# Patient Record
Sex: FEMALE | Race: WHITE | NOT HISPANIC OR LATINO | Employment: OTHER | URBAN - NONMETROPOLITAN AREA
[De-identification: names, ages, dates, MRNs, and addresses within clinical notes are randomized per-mention and may not be internally consistent; named-entity substitution may affect disease eponyms.]

---

## 2017-02-21 ENCOUNTER — OFFICE VISIT (OUTPATIENT)
Dept: GASTROENTEROLOGY | Facility: CLINIC | Age: 72
End: 2017-02-21

## 2017-02-21 VITALS
OXYGEN SATURATION: 99 % | TEMPERATURE: 97.6 F | BODY MASS INDEX: 23.74 KG/M2 | HEART RATE: 56 BPM | DIASTOLIC BLOOD PRESSURE: 70 MMHG | WEIGHT: 129 LBS | SYSTOLIC BLOOD PRESSURE: 116 MMHG | HEIGHT: 62 IN

## 2017-02-21 DIAGNOSIS — Z86.010 HX OF COLONIC POLYP: Primary | ICD-10-CM

## 2017-02-21 DIAGNOSIS — Z80.0 FAMILY HX OF COLON CANCER: ICD-10-CM

## 2017-02-21 DIAGNOSIS — I10 ESSENTIAL HYPERTENSION: ICD-10-CM

## 2017-02-21 PROCEDURE — S0260 H&P FOR SURGERY: HCPCS | Performed by: NURSE PRACTITIONER

## 2017-02-21 RX ORDER — LEVOTHYROXINE SODIUM 88 MCG
TABLET ORAL
COMMUNITY
Start: 2016-11-29

## 2017-02-21 RX ORDER — OXYBUTYNIN CHLORIDE 10 MG/1
TABLET, EXTENDED RELEASE ORAL
COMMUNITY
Start: 2017-02-10

## 2017-02-21 RX ORDER — ERGOCALCIFEROL 1.25 MG/1
CAPSULE ORAL
COMMUNITY
Start: 2016-12-26

## 2017-02-21 RX ORDER — CYCLOSPORINE 0.5 MG/ML
EMULSION OPHTHALMIC
COMMUNITY
Start: 2016-12-22

## 2017-02-21 RX ORDER — OMEPRAZOLE 40 MG/1
CAPSULE, DELAYED RELEASE ORAL
COMMUNITY
Start: 2016-12-23

## 2017-02-21 RX ORDER — FLUCONAZOLE 150 MG/1
TABLET ORAL
COMMUNITY
Start: 2016-11-16 | End: 2017-02-21

## 2017-02-21 RX ORDER — LISINOPRIL AND HYDROCHLOROTHIAZIDE 20; 12.5 MG/1; MG/1
TABLET ORAL
COMMUNITY
Start: 2017-02-13

## 2017-02-21 RX ORDER — NEBIVOLOL HYDROCHLORIDE 5 MG/1
TABLET ORAL
COMMUNITY
Start: 2017-02-13

## 2017-02-21 NOTE — PROGRESS NOTES
Chief Complaint   Patient presents with   • Colonoscopy   pcp dr giuseppe KOCH    Germaine Golden is a 71 y.o. female who presents as a referral for preventative maintenance. She has no complaints of nausea or vomiting. No change in bowels. No wt loss. No BRBPR. No melena. No abdominal pain.there is family h/o colon cancer. Her last cscope was 12/2013. She has h/o colon polyps.     Past Medical History   Diagnosis Date   • Colon polyp    • GERD (gastroesophageal reflux disease)    • Hyperlipidemia    • Hypertension    • Hypothyroidism        Past Surgical History   Procedure Laterality Date   • Hysterectomy     • Colonoscopy  12/31/2013       Outpatient Prescriptions Marked as Taking for the 2/21/17 encounter (Office Visit) with SERGIO Boudreaux   Medication Sig Dispense Refill   • BYSTOLIC 5 MG tablet      • lisinopril-hydrochlorothiazide (PRINZIDE,ZESTORETIC) 20-12.5 MG per tablet      • omeprazole (priLOSEC) 40 MG capsule      • oxybutynin XL (DITROPAN-XL) 10 MG 24 hr tablet      • RESTASIS 0.05 % ophthalmic emulsion      • SYNTHROID 88 MCG tablet      • vitamin D (ERGOCALCIFEROL) 20929 UNITS capsule capsule Every 7 (Seven) Days.         Allergies   Allergen Reactions   • Alendronate    • Ampicillin    • Percodan [Oxycodone-Aspirin]    • Pravastatin    • Simvastatin        Social History     Social History   • Marital status:      Spouse name: N/A   • Number of children: N/A   • Years of education: N/A     Occupational History   • Not on file.     Social History Main Topics   • Smoking status: Former Smoker     Quit date: 1985   • Smokeless tobacco: Not on file   • Alcohol use No   • Drug use: No   • Sexual activity: Not on file     Other Topics Concern   • Not on file     Social History Narrative       Family History   Problem Relation Age of Onset   • Colon cancer Paternal Aunt    • Colon polyps Son        Review of Systems   Constitutional: Negative for appetite change, chills,  fatigue, fever and unexpected weight change.   HENT: Negative for sore throat and trouble swallowing.    Eyes: Negative for pain and visual disturbance.   Respiratory: Negative for cough, chest tightness, shortness of breath and wheezing.    Cardiovascular: Negative for chest pain and palpitations.   Gastrointestinal: Negative for abdominal distention, abdominal pain, anal bleeding, blood in stool, constipation, diarrhea, nausea, rectal pain and vomiting.        As mentioned in hpi   Endocrine: Negative for cold intolerance and heat intolerance.   Genitourinary: Negative for difficulty urinating, dysuria and hematuria.   Musculoskeletal: Negative for arthralgias and back pain.   Skin: Negative for color change and rash.   Neurological: Negative for dizziness, tremors, seizures, syncope, light-headedness and headaches.   Hematological: Negative for adenopathy. Does not bruise/bleed easily.   Psychiatric/Behavioral: Negative for confusion. The patient is not nervous/anxious.        Objective     Vitals:    02/21/17 1432   BP: 116/70   Pulse: 56   Temp: 97.6 °F (36.4 °C)   SpO2: 99%   Body mass index is 23.59 kg/(m^2).      Physical Exam   Constitutional: She is oriented to person, place, and time. She appears well-developed and well-nourished. No distress.   HENT:   Head: Normocephalic and atraumatic.   Mouth/Throat: Oropharynx is clear and moist.   Eyes: Pupils are equal, round, and reactive to light. No scleral icterus.   Neck: Normal range of motion. Neck supple. No JVD present. No tracheal deviation present.   Cardiovascular: Normal rate, regular rhythm, normal heart sounds and intact distal pulses.  Exam reveals no gallop and no friction rub.    No murmur heard.  Pulmonary/Chest: Effort normal and breath sounds normal. No stridor. No respiratory distress. She has no wheezes. She has no rales.   Abdominal: Soft. Bowel sounds are normal. She exhibits no distension and no mass. There is no tenderness. There is no  rebound and no guarding.   Musculoskeletal: Normal range of motion. She exhibits no edema or deformity.   Neurological: She is alert and oriented to person, place, and time. No cranial nerve deficit.   Skin: Skin is warm and dry. No rash noted.   Psychiatric: She has a normal mood and affect. Her behavior is normal.   Vitals reviewed.      Imaging Results (most recent)     None          Assessment/Plan     Germaine was seen today for colonoscopy.    Diagnoses and all orders for this visit:    Hx of colonic polyp  -     Case request    Family hx of colon cancer  -     Case request    Essential hypertension    Other orders  -     Sod Picosulfate-Mag Ox-Cit Acd (PREPOPIK) 10-3.5-12 MG-GM-GM pack; Take 1 container by mouth 1 (One) Time for 1 dose. Take as directed per instruction sheet     plan for colonoscopy.  Instructed patient to take her blood pressure medication name of procedure if that is when she normally takes.    COLONOSCOPY WITH ANESTHESIA (N/A)  All risks, benefits, alternatives, and indications of colonoscopy procedure have been discussed with the patient. Risks to include perforation of the colon requiring possible surgery or colostomy, risk of bleeding from biopsies or removal of colon tissue, possibility of missing a colon polyp or cancer, or adverse drug reaction.  Benefits to include the diagnosis and management of disease of the colon and rectum. Alternatives to include barium enema, radiographic evaluation, lab testing or no intervention. Pt verbalizes understanding and agrees.       SERGIO Brooks      EMR Dragon/transcription disclaimer:  Much of this encounter note is electronic transcription/translation of spoken language to printed text.  The electronic translation of spoken language may be erroneous, or at times, nonsensical words or phrases may be inadvertently transcribed.  Although I have reviewed the note for such errors, some may still exist.

## 2017-03-14 ENCOUNTER — OUTSIDE FACILITY SERVICE (OUTPATIENT)
Dept: GASTROENTEROLOGY | Facility: CLINIC | Age: 72
End: 2017-03-14

## 2017-03-14 PROCEDURE — G0105 COLORECTAL SCRN; HI RISK IND: HCPCS | Performed by: INTERNAL MEDICINE

## 2020-06-24 ENCOUNTER — LAB REQUISITION (OUTPATIENT)
Dept: LAB | Facility: HOSPITAL | Age: 75
End: 2020-06-24

## 2020-06-24 DIAGNOSIS — Z00.00 ENCOUNTER FOR GENERAL ADULT MEDICAL EXAMINATION WITHOUT ABNORMAL FINDINGS: ICD-10-CM

## 2020-06-24 PROCEDURE — 88305 TISSUE EXAM BY PATHOLOGIST: CPT | Performed by: SPECIALIST

## 2020-06-26 LAB
CYTO UR: NORMAL
LAB AP CASE REPORT: NORMAL
LAB AP CLINICAL INFORMATION: NORMAL
PATH REPORT.FINAL DX SPEC: NORMAL
PATH REPORT.GROSS SPEC: NORMAL

## 2022-03-01 ENCOUNTER — OFFICE VISIT (OUTPATIENT)
Dept: GASTROENTEROLOGY | Facility: CLINIC | Age: 77
End: 2022-03-01

## 2022-03-01 VITALS
HEIGHT: 62 IN | OXYGEN SATURATION: 99 % | SYSTOLIC BLOOD PRESSURE: 146 MMHG | TEMPERATURE: 96.8 F | WEIGHT: 123 LBS | BODY MASS INDEX: 22.63 KG/M2 | HEART RATE: 55 BPM | DIASTOLIC BLOOD PRESSURE: 74 MMHG

## 2022-03-01 DIAGNOSIS — Z80.0 FAMILY HX OF COLON CANCER: ICD-10-CM

## 2022-03-01 DIAGNOSIS — Z86.010 HISTORY OF ADENOMATOUS POLYP OF COLON: Primary | ICD-10-CM

## 2022-03-01 DIAGNOSIS — Z83.71 FAMILY HX COLONIC POLYPS: ICD-10-CM

## 2022-03-01 PROCEDURE — S0260 H&P FOR SURGERY: HCPCS | Performed by: NURSE PRACTITIONER

## 2022-03-01 RX ORDER — SODIUM PICOSULFATE, MAGNESIUM OXIDE, AND ANHYDROUS CITRIC ACID 10; 3.5; 12 MG/160ML; G/160ML; G/160ML
160 LIQUID ORAL ONCE
Qty: 320 ML | Refills: 0 | Status: SHIPPED | OUTPATIENT
Start: 2022-03-01 | End: 2022-03-01

## 2022-03-02 PROBLEM — Z86.0101 HISTORY OF ADENOMATOUS POLYP OF COLON: Status: ACTIVE | Noted: 2022-03-02

## 2022-03-02 PROBLEM — Z83.719 FAMILY HX COLONIC POLYPS: Status: ACTIVE | Noted: 2022-03-02

## 2022-03-02 PROBLEM — Z83.71 FAMILY HX COLONIC POLYPS: Status: ACTIVE | Noted: 2022-03-02

## 2022-03-02 PROBLEM — Z86.010 HISTORY OF ADENOMATOUS POLYP OF COLON: Status: ACTIVE | Noted: 2022-03-02

## 2022-03-02 PROBLEM — Z80.0 FAMILY HX OF COLON CANCER: Status: ACTIVE | Noted: 2022-03-02

## 2022-03-25 ENCOUNTER — ANESTHESIA EVENT (OUTPATIENT)
Dept: GASTROENTEROLOGY | Facility: HOSPITAL | Age: 77
End: 2022-03-25

## 2022-03-25 ENCOUNTER — ANESTHESIA (OUTPATIENT)
Dept: GASTROENTEROLOGY | Facility: HOSPITAL | Age: 77
End: 2022-03-25

## 2022-03-25 ENCOUNTER — TELEPHONE (OUTPATIENT)
Dept: GASTROENTEROLOGY | Facility: CLINIC | Age: 77
End: 2022-03-25

## 2022-03-25 ENCOUNTER — HOSPITAL ENCOUNTER (OUTPATIENT)
Facility: HOSPITAL | Age: 77
Setting detail: HOSPITAL OUTPATIENT SURGERY
Discharge: HOME | End: 2022-03-25
Attending: INTERNAL MEDICINE | Admitting: INTERNAL MEDICINE

## 2022-03-25 VITALS
SYSTOLIC BLOOD PRESSURE: 130 MMHG | DIASTOLIC BLOOD PRESSURE: 68 MMHG | RESPIRATION RATE: 16 BRPM | WEIGHT: 117 LBS | TEMPERATURE: 97.6 F | HEART RATE: 68 BPM | BODY MASS INDEX: 21.53 KG/M2 | HEIGHT: 62 IN | OXYGEN SATURATION: 98 %

## 2022-03-25 PROCEDURE — 25010000002 PROPOFOL 10 MG/ML EMULSION: Performed by: NURSE ANESTHETIST, CERTIFIED REGISTERED

## 2022-03-25 PROCEDURE — 45385 COLONOSCOPY W/LESION REMOVAL: CPT | Performed by: INTERNAL MEDICINE

## 2022-03-25 RX ORDER — ONDANSETRON 2 MG/ML
4 INJECTION INTRAMUSCULAR; INTRAVENOUS ONCE AS NEEDED
Status: DISCONTINUED | OUTPATIENT
Start: 2022-03-25 | End: 2022-03-25 | Stop reason: HOSPADM

## 2022-03-25 RX ORDER — LIDOCAINE HYDROCHLORIDE 10 MG/ML
0.5 INJECTION, SOLUTION EPIDURAL; INFILTRATION; INTRACAUDAL; PERINEURAL ONCE AS NEEDED
Status: DISCONTINUED | OUTPATIENT
Start: 2022-03-25 | End: 2022-03-25 | Stop reason: HOSPADM

## 2022-03-25 RX ORDER — LIDOCAINE HYDROCHLORIDE 20 MG/ML
INJECTION, SOLUTION EPIDURAL; INFILTRATION; INTRACAUDAL; PERINEURAL AS NEEDED
Status: DISCONTINUED | OUTPATIENT
Start: 2022-03-25 | End: 2022-03-25 | Stop reason: SURG

## 2022-03-25 RX ORDER — PROPOFOL 10 MG/ML
VIAL (ML) INTRAVENOUS AS NEEDED
Status: DISCONTINUED | OUTPATIENT
Start: 2022-03-25 | End: 2022-03-25 | Stop reason: SURG

## 2022-03-25 RX ORDER — SODIUM CHLORIDE 0.9 % (FLUSH) 0.9 %
10 SYRINGE (ML) INJECTION AS NEEDED
Status: DISCONTINUED | OUTPATIENT
Start: 2022-03-25 | End: 2022-03-25 | Stop reason: HOSPADM

## 2022-03-25 RX ORDER — SODIUM CHLORIDE 9 MG/ML
500 INJECTION, SOLUTION INTRAVENOUS CONTINUOUS PRN
Status: DISCONTINUED | OUTPATIENT
Start: 2022-03-25 | End: 2022-03-25 | Stop reason: HOSPADM

## 2022-03-25 RX ADMIN — PROPOFOL 50 MG: 10 INJECTION, EMULSION INTRAVENOUS at 08:56

## 2022-03-25 RX ADMIN — SODIUM CHLORIDE 500 ML: 9 INJECTION, SOLUTION INTRAVENOUS at 07:42

## 2022-03-25 RX ADMIN — LIDOCAINE HYDROCHLORIDE 50 MG: 20 INJECTION, SOLUTION EPIDURAL; INFILTRATION; INTRACAUDAL; PERINEURAL at 08:47

## 2022-03-25 RX ADMIN — PROPOFOL 100 MG: 10 INJECTION, EMULSION INTRAVENOUS at 08:48

## 2022-03-25 NOTE — ANESTHESIA PREPROCEDURE EVALUATION
Anesthesia Evaluation     Patient summary reviewed   no history of anesthetic complications:  NPO Solid Status: > 8 hours  NPO Liquid Status: > 4 hours           Airway   Mallampati: I  TM distance: >3 FB  Neck ROM: full  Dental    (+) partials and upper dentures    Pulmonary - negative pulmonary ROS   Cardiovascular   Exercise tolerance: good (4-7 METS)    (+) hypertension, hyperlipidemia,       Neuro/Psych- negative ROS  GI/Hepatic/Renal/Endo    (+)  GERD,  thyroid problem hypothyroidism  (-) liver disease, no renal disease, diabetes    Musculoskeletal     Abdominal    Substance History      OB/GYN          Other                        Anesthesia Plan    ASA 2     MAC     intravenous induction     Anesthetic plan, all risks, benefits, and alternatives have been provided, discussed and informed consent has been obtained with: patient.        CODE STATUS:

## 2022-03-25 NOTE — ANESTHESIA POSTPROCEDURE EVALUATION
Patient: Germaine Golden    Procedure Summary     Date: 03/25/22 Room / Location: [facility] ENDOSCOPY 4 / [facility]    Anesthesia Start: 0843 Anesthesia Stop: 0905    Procedure: COLONOSCOPY WITH ANESTHESIA (N/A ) Diagnosis:       History of adenomatous polyp of colon      Family hx of colon cancer      Family hx colonic polyps      (History of adenomatous polyp of colon [Z86.010])      (Family hx of colon cancer [Z80.0])      (Family hx colonic polyps [Z83.71])    Surgeons: Joao Saleem MD Provider: Tim Navarro CRNA    Anesthesia Type: MAC ASA Status: 2          Anesthesia Type: MAC    Vitals  Vitals Value Taken Time   BP     Temp     Pulse 64 03/25/22 0905   Resp     SpO2 98 % 03/25/22 0905   Vitals shown include unvalidated device data.        Post Anesthesia Care and Evaluation    Patient location during evaluation: PHASE II  Patient participation: complete - patient participated  Level of consciousness: awake  Pain score: 0  Pain management: adequate  Airway patency: patent  Anesthetic complications: No anesthetic complications  PONV Status: none  Cardiovascular status: acceptable  Respiratory status: acceptable  Hydration status: acceptable

## 2022-11-08 ENCOUNTER — TRANSCRIBE ORDERS (OUTPATIENT)
Dept: ADMINISTRATIVE | Facility: HOSPITAL | Age: 77
End: 2022-11-08

## 2022-11-08 DIAGNOSIS — Z78.0 OSTEOPENIA AFTER MENOPAUSE: Primary | ICD-10-CM

## 2022-11-08 DIAGNOSIS — M85.80 OSTEOPENIA AFTER MENOPAUSE: Primary | ICD-10-CM

## 2022-11-21 ENCOUNTER — APPOINTMENT (OUTPATIENT)
Dept: ONCOLOGY | Facility: HOSPITAL | Age: 77
End: 2022-11-21

## 2022-11-21 ENCOUNTER — APPOINTMENT (OUTPATIENT)
Dept: LAB | Facility: HOSPITAL | Age: 77
End: 2022-11-21

## 2022-11-28 ENCOUNTER — INFUSION (OUTPATIENT)
Dept: ONCOLOGY | Facility: HOSPITAL | Age: 77
End: 2022-11-28

## 2022-11-28 ENCOUNTER — LAB (OUTPATIENT)
Dept: LAB | Facility: HOSPITAL | Age: 77
End: 2022-11-28

## 2022-11-28 VITALS
HEIGHT: 62 IN | RESPIRATION RATE: 16 BRPM | BODY MASS INDEX: 23 KG/M2 | HEART RATE: 65 BPM | TEMPERATURE: 97.7 F | SYSTOLIC BLOOD PRESSURE: 153 MMHG | OXYGEN SATURATION: 100 % | WEIGHT: 125 LBS | DIASTOLIC BLOOD PRESSURE: 59 MMHG

## 2022-11-28 DIAGNOSIS — M85.80 OSTEOPENIA, UNSPECIFIED LOCATION: Primary | ICD-10-CM

## 2022-11-28 DIAGNOSIS — M85.80 OSTEOPENIA AFTER MENOPAUSE: ICD-10-CM

## 2022-11-28 DIAGNOSIS — Z78.0 OSTEOPENIA AFTER MENOPAUSE: ICD-10-CM

## 2022-11-28 LAB
CALCIUM SPEC-SCNC: 9.5 MG/DL (ref 8.6–10.5)
MAGNESIUM SERPL-MCNC: 2.1 MG/DL (ref 1.6–2.4)
PHOSPHATE SERPL-MCNC: 3.3 MG/DL (ref 2.5–4.5)

## 2022-11-28 PROCEDURE — 96372 THER/PROPH/DIAG INJ SC/IM: CPT

## 2022-11-28 PROCEDURE — 83735 ASSAY OF MAGNESIUM: CPT

## 2022-11-28 PROCEDURE — 82310 ASSAY OF CALCIUM: CPT

## 2022-11-28 PROCEDURE — 25010000002 DENOSUMAB 60 MG/ML SOLUTION PREFILLED SYRINGE: Performed by: PHYSICIAN ASSISTANT

## 2022-11-28 PROCEDURE — 36415 COLL VENOUS BLD VENIPUNCTURE: CPT

## 2022-11-28 PROCEDURE — 84100 ASSAY OF PHOSPHORUS: CPT

## 2022-11-28 RX ADMIN — DENOSUMAB 60 MG: 60 INJECTION SUBCUTANEOUS at 09:42

## 2023-02-02 ENCOUNTER — TELEPHONE (OUTPATIENT)
Dept: OTOLARYNGOLOGY | Facility: CLINIC | Age: 78
End: 2023-02-02
Payer: MEDICARE

## 2023-02-02 NOTE — TELEPHONE ENCOUNTER
s/p upper and lower bleph and brow and facelift 40 years ago by Cesario.  Was very pleased.   s/p upper and lower bleph by Dr. Santos 15 years ago.  [place] by redundancy of upper eyelids and excessive skin of the lower eyelids. Also significant laxity. Thinks it is interfering with vision - gave order on Rx for VFT.  Could benefit from Botox to glabella and crows, but would not want to alter VFT.  Also, interested in revision facelift.  Gave Kal's number to call.    Electronically signed by Hong Mckeon MD, 02/02/23, 3:52 PM CST.

## 2023-02-14 ENCOUNTER — TELEPHONE (OUTPATIENT)
Dept: OTOLARYNGOLOGY | Facility: CLINIC | Age: 78
End: 2023-02-14

## 2023-02-14 NOTE — TELEPHONE ENCOUNTER
Caller: LISA SAMAYOA    Relationship to patient: SELF    Best call back number: [phone]    Patient is needing: PT STATES SHE HAD HER EYE DOCTOR DO HER EYELID TEST ON 2/9/23 @ [facility] AND REPORT HAS BEEN FAXED, PENDING RECEIPT.

## 2023-05-01 ENCOUNTER — OFFICE VISIT (OUTPATIENT)
Dept: OTOLARYNGOLOGY | Facility: CLINIC | Age: 78
End: 2023-05-01
Payer: MEDICARE

## 2023-05-01 VITALS
RESPIRATION RATE: 20 BRPM | HEART RATE: 85 BPM | WEIGHT: 125 LBS | HEIGHT: 62 IN | BODY MASS INDEX: 23 KG/M2 | TEMPERATURE: 98 F | SYSTOLIC BLOOD PRESSURE: 142 MMHG | DIASTOLIC BLOOD PRESSURE: 78 MMHG

## 2023-05-01 DIAGNOSIS — H02.834 DERMATOCHALASIS OF BOTH UPPER EYELIDS: Primary | ICD-10-CM

## 2023-05-01 DIAGNOSIS — H02.30 PSEUDOPTOSIS, UNSPECIFIED LATERALITY: ICD-10-CM

## 2023-05-01 DIAGNOSIS — H02.831 DERMATOCHALASIS OF BOTH UPPER EYELIDS: Primary | ICD-10-CM

## 2023-05-01 DIAGNOSIS — H53.483 BILATERAL VISUAL FIELD CONSTRICTION: ICD-10-CM

## 2023-05-01 RX ORDER — IBUPROFEN 200 MG
1 TABLET ORAL EVERY 6 HOURS
COMMUNITY

## 2023-05-01 RX ORDER — LISINOPRIL 20 MG/1
TABLET ORAL
COMMUNITY

## 2023-05-01 RX ORDER — EZETIMIBE 10 MG/1
TABLET ORAL
COMMUNITY
Start: 2021-10-30

## 2023-05-01 RX ORDER — BIOTIN 10000 MCG
CAPSULE ORAL
COMMUNITY

## 2023-05-01 RX ORDER — ATENOLOL 25 MG/1
TABLET ORAL
COMMUNITY
Start: 2021-03-30

## 2023-05-01 RX ORDER — NEBIVOLOL 10 MG/1
TABLET ORAL
COMMUNITY
End: 2023-05-01

## 2023-05-01 RX ORDER — LEVOTHYROXINE SODIUM 100 UG/1
1 CAPSULE ORAL DAILY
COMMUNITY

## 2023-05-01 RX ORDER — TERBINAFINE HYDROCHLORIDE 250 MG/1
TABLET ORAL
COMMUNITY

## 2023-05-01 RX ORDER — OLMESARTAN MEDOXOMIL, AMLODIPINE AND HYDROCHLOROTHIAZIDE TABLET 40/5/25 MG 40; 5; 25 MG/1; MG/1; MG/1
TABLET ORAL
COMMUNITY
End: 2023-05-01

## 2023-05-01 RX ORDER — ROSUVASTATIN CALCIUM 5 MG/1
1 TABLET, COATED ORAL DAILY
COMMUNITY
End: 2023-05-01

## 2023-05-01 RX ORDER — FERROUS SULFATE 325(65) MG
TABLET ORAL EVERY 12 HOURS SCHEDULED
COMMUNITY

## 2023-05-01 RX ORDER — OMEPRAZOLE 40 MG/1
1 CAPSULE, DELAYED RELEASE ORAL
COMMUNITY
End: 2023-05-01

## 2023-05-01 RX ORDER — OXYBUTYNIN CHLORIDE 5 MG/1
1 TABLET ORAL EVERY 12 HOURS
COMMUNITY
End: 2023-05-01

## 2023-05-01 RX ORDER — DEXTROMETHORPHAN HYDROBROMIDE AND PROMETHAZINE HYDROCHLORIDE 15; 6.25 MG/5ML; MG/5ML
SYRUP ORAL
COMMUNITY

## 2023-05-01 RX ORDER — LISINOPRIL AND HYDROCHLOROTHIAZIDE 20; 12.5 MG/1; MG/1
TABLET ORAL
COMMUNITY
End: 2023-05-01

## 2023-05-01 NOTE — LETTER
May 1, 2023       No Recipients    Patient: Germaine Golden   YOB: 1945   Date of Visit: 2023       Dear Dr. Koenig Recipients:    Thank you for referring Germaine Golden to me for evaluation. Below are the relevant portions of my assessment and plan of care.    If you have questions, please do not hesitate to call me. I look forward to following Germaine along with you.         Sincerely,        Hong Mckeon MD        CC:   No Recipients    Hong Mckeon MD  23 1451  Signed  Chief Complaint:   Chief Complaint   Patient presents with   • Blepharitis     Bleph eval       HPI: Germaine Golden is a 78 y.o. who presents for blepharoplasty evaluation.  She reports for excess eyelid skin affecting both upper eyelids for 3 years. She doesn't lie the look of it.  She did not know it was bothering her peripheral vision until the VFT was performed.  Bothered by the fullness in the left lower eyelid. This was revised by Dr. Santos, but noticed no improvement    She has had an upper and lower blepharoplasty, brow lift, and facelift 40 years ago by Dr. Nassar.  She is also had upper and lower blepharoplasty by Dr. Santos 15 years ago.    Past Medical History:   Diagnosis Date   • Colon polyp    • GERD (gastroesophageal reflux disease)    • Hyperlipidemia    • Hypertension    • Hypothyroidism      Past Surgical History:   Procedure Laterality Date   • COLONOSCOPY  2013   • COLONOSCOPY N/A 3/25/2022    Procedure: COLONOSCOPY WITH ANESTHESIA;  Surgeon: Joao Saleem MD;  Location: [facility] ENDOSCOPY;  Service: Gastroenterology;  Laterality: N/A;  pre hx polyp, family hx of colon ca  post polyp  Colt Beckham MD   • HYSTERECTOMY       Family History   Problem Relation Age of Onset   • Colon cancer Paternal Aunt    • Colon polyps Son      Social History     Tobacco Use   • Smoking status: Former     Types: Cigarettes     Quit date:      Years since quittin.3   • Smokeless  tobacco: Never   Substance Use Topics   • Alcohol use: No   • Drug use: No     Allergies:  Alendronate, Levaquin [levofloxacin], Pravastatin, Simvastatin, Ampicillin, and Percodan [oxycodone-aspirin]    Current Outpatient Medications:   •  atenolol (TENORMIN) 25 MG tablet, atenolol 25 mg tablet  TAKE 1 TABLET DAILY, Disp: , Rfl:   •  Biotin 10 MG capsule, , Disp: , Rfl:   •  denosumab (Prolia) 60 MG/ML solution syringe, Prolia 60 mg/mL subcutaneous syringe  Inject 1ml every 6 months, Disp: , Rfl:   •  ezetimibe (ZETIA) 10 MG tablet, ezetimibe 10 mg tablet  Take 1 tablet every day by oral route., Disp: , Rfl:   •  ferrous sulfate 325 (65 FE) MG tablet, Every 12 (Twelve) Hours., Disp: , Rfl:   •  ibuprofen (ADVIL,MOTRIN) 200 MG tablet, Take 1 tablet by mouth Every 6 (Six) Hours., Disp: , Rfl:   •  levothyroxine sodium (TIROSINT) 100 MCG capsule, Take 1 capsule by mouth Daily., Disp: , Rfl:   •  lisinopril (PRINIVIL,ZESTRIL) 20 MG tablet, lisinopril 20 mg tablet, Disp: , Rfl:   •  omeprazole (priLOSEC) 40 MG capsule, , Disp: , Rfl:   •  oxybutynin XL (DITROPAN-XL) 10 MG 24 hr tablet, , Disp: , Rfl:   •  pitavastatin calcium (LIVALO) 1 MG tablet tablet, Livalo 1 mg tablet, Disp: , Rfl:   •  promethazine-dextromethorphan (PROMETHAZINE-DM) 6.25-15 MG/5ML syrup, promethazine-DM 6.25 mg-15 mg/5 mL oral syrup, Disp: , Rfl:   •  SYNTHROID 88 MCG tablet, , Disp: , Rfl:   •  terbinafine (lamiSIL) 250 MG tablet, terbinafine HCl 250 mg tablet, Disp: , Rfl:   •  vitamin D (ERGOCALCIFEROL) 73862 UNITS capsule capsule, Every 7 (Seven) Days., Disp: , Rfl:     Previous Eyelid Surgery: Upper and lower bleph x 2 (plus revision on the left lower)  Previous Eye Surgery: none.  Left eye is lazy.  Vision very poor on the left.  Dry Eye Symptoms: very  Contact lens or eyeglass use: Glasses  Eye medication use: none  Thyroid Issues: Takes synthroid.    Review of Systems   Constitutional: Negative for chills and fever.   Eyes: Negative for  "pain and visual disturbance.       Constitutional:  /78   Pulse 85   Temp 98 °F (36.7 °C)   Resp 20   Ht 157.5 cm (62\")   Wt 56.7 kg (125 lb)   BMI 22.86 kg/m² .    Well-developed and well nourished.    Eyes: PERR.  EOMI.  No icterus.  Conjunctiva is healthy.  Lids:  -OD: Excessive upper eyelid skin with hooding.  Decent levator excursion (18 mm) without evidence of ptosis.  MRD-1: 3.0 mm.  Lower lid fails snap test.  -OS: Excessive upper eyelid skin with hooding.  Decent levator excursion (18 mm) without evidence of ptosis.  MRD-1: 3.0 mm.  Lower lid with mild ectropion.  Fails snap test.  Bothered by the fullness in the left lower eyelid.  Brow:  Above the orbital rim  Ears, Nose, Mouth, and throat:: NCAT.  External auricles normal without EAC discharge.  Nose: No rhinnorhea.    Neck: No mass.  Symmetric.  Trachea midline.    Respiratory: Normal effort.      Skin: No concerning lesions of the head/neck    Neuro: CN II-XII intact bilaterally.      MS: Normal gait and station.    Data Review: A visual field test has been performed. The results are available in the chart. Visual field testing demonstrates a 20 degree improvement on the right and a 20 degree improvement on the left with taping.    Bothered by fullness of the right lateral      Assessment    1. Dermatochalasis of both upper eyelids    2. Pseudoptosis, unspecified laterality    3. Bilateral visual field constriction        With lack of visual complaints, I have offered a cosmetic upper blepharoplasty.  We discussed the risks, benefits, alternatives, and complications of a bilateral upper blepharoplasty, which include, but are not limited to, bleeding, bruising, dry eyes, milia formation, damage to the eyes, loss of sight, pain, eyelid malposition.    She may opt for lower blepharoplasty with tarsal tightening down the road.       She would like this to be performed in the office under local.    Hong Mckeon MD  05/01/23  14:36 " CDT

## 2023-05-01 NOTE — PROGRESS NOTES
Chief Complaint:   Chief Complaint   Patient presents with   • Blepharitis     Bleph eval       HPI: Germaine Golden is a 78 y.o. who presents for blepharoplasty evaluation.  She reports for excess eyelid skin affecting both upper eyelids for 3 years. She doesn't lie the look of it.  She did not know it was bothering her peripheral vision until the VFT was performed.  Bothered by the fullness in the left lower eyelid. This was revised by Dr. Santos, but noticed no improvement    She has had an upper and lower blepharoplasty, brow lift, and facelift 40 years ago by Dr. Nassar.  She is also had upper and lower blepharoplasty by Dr. Santos 15 years ago.    Past Medical History:   Diagnosis Date   • Colon polyp    • GERD (gastroesophageal reflux disease)    • Hyperlipidemia    • Hypertension    • Hypothyroidism      Past Surgical History:   Procedure Laterality Date   • COLONOSCOPY  2013   • COLONOSCOPY N/A 3/25/2022    Procedure: COLONOSCOPY WITH ANESTHESIA;  Surgeon: Joao Saleem MD;  Location: [facility] ENDOSCOPY;  Service: Gastroenterology;  Laterality: N/A;  pre hx polyp, family hx of colon ca  post polyp  Colt Beckham MD   • HYSTERECTOMY       Family History   Problem Relation Age of Onset   • Colon cancer Paternal Aunt    • Colon polyps Son      Social History     Tobacco Use   • Smoking status: Former     Types: Cigarettes     Quit date:      Years since quittin.3   • Smokeless tobacco: Never   Substance Use Topics   • Alcohol use: No   • Drug use: No     Allergies:  Alendronate, Levaquin [levofloxacin], Pravastatin, Simvastatin, Ampicillin, and Percodan [oxycodone-aspirin]    Current Outpatient Medications:   •  atenolol (TENORMIN) 25 MG tablet, atenolol 25 mg tablet  TAKE 1 TABLET DAILY, Disp: , Rfl:   •  Biotin 10 MG capsule, , Disp: , Rfl:   •  denosumab (Prolia) 60 MG/ML solution syringe, Prolia 60 mg/mL subcutaneous syringe  Inject 1ml every 6 months, Disp: , Rfl:   •   "ezetimibe (ZETIA) 10 MG tablet, ezetimibe 10 mg tablet  Take 1 tablet every day by oral route., Disp: , Rfl:   •  ferrous sulfate 325 (65 FE) MG tablet, Every 12 (Twelve) Hours., Disp: , Rfl:   •  ibuprofen (ADVIL,MOTRIN) 200 MG tablet, Take 1 tablet by mouth Every 6 (Six) Hours., Disp: , Rfl:   •  levothyroxine sodium (TIROSINT) 100 MCG capsule, Take 1 capsule by mouth Daily., Disp: , Rfl:   •  lisinopril (PRINIVIL,ZESTRIL) 20 MG tablet, lisinopril 20 mg tablet, Disp: , Rfl:   •  omeprazole (priLOSEC) 40 MG capsule, , Disp: , Rfl:   •  oxybutynin XL (DITROPAN-XL) 10 MG 24 hr tablet, , Disp: , Rfl:   •  pitavastatin calcium (LIVALO) 1 MG tablet tablet, Livalo 1 mg tablet, Disp: , Rfl:   •  promethazine-dextromethorphan (PROMETHAZINE-DM) 6.25-15 MG/5ML syrup, promethazine-DM 6.25 mg-15 mg/5 mL oral syrup, Disp: , Rfl:   •  SYNTHROID 88 MCG tablet, , Disp: , Rfl:   •  terbinafine (lamiSIL) 250 MG tablet, terbinafine HCl 250 mg tablet, Disp: , Rfl:   •  vitamin D (ERGOCALCIFEROL) 30358 UNITS capsule capsule, Every 7 (Seven) Days., Disp: , Rfl:     Previous Eyelid Surgery: Upper and lower bleph x 2 (plus revision on the left lower)  Previous Eye Surgery: none.  Left eye is lazy.  Vision very poor on the left.  Dry Eye Symptoms: very  Contact lens or eyeglass use: Glasses  Eye medication use: none  Thyroid Issues: Takes synthroid.    Review of Systems   Constitutional: Negative for chills and fever.   Eyes: Negative for pain and visual disturbance.       Constitutional:  /78   Pulse 85   Temp 98 °F (36.7 °C)   Resp 20   Ht 157.5 cm (62\")   Wt 56.7 kg (125 lb)   BMI 22.86 kg/m² .    Well-developed and well nourished.    Eyes: PERR.  EOMI.  No icterus.  Conjunctiva is healthy.  Lids:  -OD: Excessive upper eyelid skin with hooding.  Decent levator excursion (18 mm) without evidence of ptosis.  MRD-1: 3.0 mm.  Lower lid fails snap test.  -OS: Excessive upper eyelid skin with hooding.  Decent levator excursion (18 " mm) without evidence of ptosis.  MRD-1: 3.0 mm.  Lower lid with mild ectropion.  Fails snap test.  Bothered by the fullness in the left lower eyelid.  Brow:  Above the orbital rim  Ears, Nose, Mouth, and throat:: NCAT.  External auricles normal without EAC discharge.  Nose: No rhinnorhea.    Neck: No mass.  Symmetric.  Trachea midline.    Respiratory: Normal effort.      Skin: No concerning lesions of the head/neck    Neuro: CN II-XII intact bilaterally.      MS: Normal gait and station.    Data Review: A visual field test has been performed. The results are available in the chart. Visual field testing demonstrates a 20 degree improvement on the right and a 20 degree improvement on the left with taping.    Bothered by fullness of the right lateral      Assessment   1. Dermatochalasis of both upper eyelids    2. Pseudoptosis, unspecified laterality    3. Bilateral visual field constriction        With lack of visual complaints, I have offered a cosmetic upper blepharoplasty.  We discussed the risks, benefits, alternatives, and complications of a bilateral upper blepharoplasty, which include, but are not limited to, bleeding, bruising, dry eyes, milia formation, damage to the eyes, loss of sight, pain, eyelid malposition.    She may opt for lower blepharoplasty with tarsal tightening down the road.       She would like this to be performed in the office under local.    Hong Mckeon MD  05/01/23  14:36 CDT

## 2023-05-30 ENCOUNTER — INFUSION (OUTPATIENT)
Dept: ONCOLOGY | Facility: HOSPITAL | Age: 78
End: 2023-05-30

## 2023-05-30 ENCOUNTER — LAB (OUTPATIENT)
Dept: LAB | Facility: HOSPITAL | Age: 78
End: 2023-05-30

## 2023-05-30 ENCOUNTER — TRANSCRIBE ORDERS (OUTPATIENT)
Dept: ADMINISTRATIVE | Facility: HOSPITAL | Age: 78
End: 2023-05-30

## 2023-05-30 VITALS
HEIGHT: 62 IN | SYSTOLIC BLOOD PRESSURE: 166 MMHG | TEMPERATURE: 98 F | HEART RATE: 57 BPM | DIASTOLIC BLOOD PRESSURE: 46 MMHG | BODY MASS INDEX: 23 KG/M2 | WEIGHT: 125 LBS | RESPIRATION RATE: 18 BRPM | OXYGEN SATURATION: 98 %

## 2023-05-30 DIAGNOSIS — M85.80 OSTEOPENIA, UNSPECIFIED LOCATION: Primary | ICD-10-CM

## 2023-05-30 DIAGNOSIS — M85.80 OSTEOPENIA, UNSPECIFIED LOCATION: ICD-10-CM

## 2023-05-30 LAB
CALCIUM SPEC-SCNC: 9.2 MG/DL (ref 8.6–10.5)
MAGNESIUM SERPL-MCNC: 2.1 MG/DL (ref 1.6–2.4)
PHOSPHATE SERPL-MCNC: 3.2 MG/DL (ref 2.5–4.5)

## 2023-05-30 PROCEDURE — 83735 ASSAY OF MAGNESIUM: CPT

## 2023-05-30 PROCEDURE — 84100 ASSAY OF PHOSPHORUS: CPT

## 2023-05-30 PROCEDURE — 82310 ASSAY OF CALCIUM: CPT

## 2023-05-30 PROCEDURE — 36415 COLL VENOUS BLD VENIPUNCTURE: CPT

## 2023-05-31 ENCOUNTER — INFUSION (OUTPATIENT)
Dept: ONCOLOGY | Facility: HOSPITAL | Age: 78
End: 2023-05-31

## 2023-05-31 VITALS
SYSTOLIC BLOOD PRESSURE: 152 MMHG | HEART RATE: 60 BPM | TEMPERATURE: 98.4 F | WEIGHT: 124.4 LBS | OXYGEN SATURATION: 100 % | BODY MASS INDEX: 22.89 KG/M2 | DIASTOLIC BLOOD PRESSURE: 52 MMHG | RESPIRATION RATE: 18 BRPM | HEIGHT: 62 IN

## 2023-05-31 DIAGNOSIS — M85.80 OSTEOPENIA, UNSPECIFIED LOCATION: Primary | ICD-10-CM

## 2023-05-31 PROCEDURE — 25010000002 DENOSUMAB 60 MG/ML SOLUTION PREFILLED SYRINGE: Performed by: INTERNAL MEDICINE

## 2023-05-31 PROCEDURE — 96372 THER/PROPH/DIAG INJ SC/IM: CPT

## 2023-05-31 RX ADMIN — DENOSUMAB 60 MG: 60 INJECTION SUBCUTANEOUS at 09:38

## 2023-10-30 DIAGNOSIS — M85.80 OSTEOPENIA, UNSPECIFIED LOCATION: Primary | ICD-10-CM

## 2023-10-31 ENCOUNTER — TRANSCRIBE ORDERS (OUTPATIENT)
Dept: ADMINISTRATIVE | Facility: HOSPITAL | Age: 78
End: 2023-10-31
Payer: MEDICARE

## 2023-10-31 DIAGNOSIS — R07.89 OTHER CHEST PAIN: Primary | ICD-10-CM

## 2023-11-14 ENCOUNTER — HOSPITAL ENCOUNTER (OUTPATIENT)
Dept: CARDIOLOGY | Facility: HOSPITAL | Age: 78
Discharge: HOME | End: 2023-11-14
Payer: MEDICARE

## 2023-11-14 VITALS
SYSTOLIC BLOOD PRESSURE: 123 MMHG | BODY MASS INDEX: 22.26 KG/M2 | HEART RATE: 65 BPM | HEIGHT: 62 IN | DIASTOLIC BLOOD PRESSURE: 58 MMHG | WEIGHT: 121 LBS

## 2023-11-14 DIAGNOSIS — R07.89 OTHER CHEST PAIN: ICD-10-CM

## 2023-11-14 PROCEDURE — 93017 CV STRESS TEST TRACING ONLY: CPT

## 2023-11-14 PROCEDURE — 25510000001 PERFLUTREN 6.52 MG/ML SUSPENSION: Performed by: EMERGENCY MEDICINE

## 2023-11-14 PROCEDURE — 93350 STRESS TTE ONLY: CPT

## 2023-11-14 RX ADMIN — PERFLUTREN 8.48 MG: 6.52 INJECTION, SUSPENSION INTRAVENOUS at 10:32

## 2023-11-19 LAB
BH CV STRESS BP STAGE 1: NORMAL
BH CV STRESS BP STAGE 2: NORMAL
BH CV STRESS DURATION MIN STAGE 1: 3
BH CV STRESS DURATION MIN STAGE 2: 1
BH CV STRESS DURATION SEC STAGE 1: 0
BH CV STRESS DURATION SEC STAGE 2: 10
BH CV STRESS GRADE STAGE 1: 10
BH CV STRESS GRADE STAGE 2: 12
BH CV STRESS HR STAGE 1: 123
BH CV STRESS HR STAGE 2: 133
BH CV STRESS METS STAGE 1: 5
BH CV STRESS METS STAGE 2: 7.5
BH CV STRESS PROTOCOL 1: NORMAL
BH CV STRESS RECOVERY BP: NORMAL MMHG
BH CV STRESS RECOVERY HR: 74 BPM
BH CV STRESS SPEED STAGE 1: 1.7
BH CV STRESS SPEED STAGE 2: 2.5
BH CV STRESS STAGE 1: 1
BH CV STRESS STAGE 2: 2
MAXIMAL PREDICTED HEART RATE: 140 BPM
PERCENT MAX PREDICTED HR: 95 %
STRESS BASELINE BP: NORMAL MMHG
STRESS BASELINE HR: 65 BPM
STRESS PERCENT HR: 112 %
STRESS POST ESTIMATED WORKLOAD: 7.5 METS
STRESS POST EXERCISE DUR MIN: 4 MIN
STRESS POST EXERCISE DUR SEC: 10 SEC
STRESS POST PEAK BP: NORMAL MMHG
STRESS POST PEAK HR: 133 BPM
STRESS TARGET HR: 119 BPM

## 2023-12-01 ENCOUNTER — LAB (OUTPATIENT)
Dept: LAB | Facility: HOSPITAL | Age: 78
End: 2023-12-01
Payer: MEDICARE

## 2023-12-01 ENCOUNTER — INFUSION (OUTPATIENT)
Dept: ONCOLOGY | Facility: HOSPITAL | Age: 78
End: 2023-12-01
Payer: MEDICARE

## 2023-12-01 VITALS
BODY MASS INDEX: 23.19 KG/M2 | OXYGEN SATURATION: 97 % | RESPIRATION RATE: 18 BRPM | HEIGHT: 62 IN | HEART RATE: 66 BPM | TEMPERATURE: 97.6 F | WEIGHT: 126 LBS | DIASTOLIC BLOOD PRESSURE: 55 MMHG | SYSTOLIC BLOOD PRESSURE: 126 MMHG

## 2023-12-01 DIAGNOSIS — M85.80 OSTEOPENIA, UNSPECIFIED LOCATION: ICD-10-CM

## 2023-12-01 DIAGNOSIS — M85.80 OSTEOPENIA, UNSPECIFIED LOCATION: Primary | ICD-10-CM

## 2023-12-01 LAB
25(OH)D3 SERPL-MCNC: 63.3 NG/ML (ref 30–100)
ALBUMIN SERPL-MCNC: 4.1 G/DL (ref 3.5–5.2)
ALBUMIN/GLOB SERPL: 1.5 G/DL
ALP SERPL-CCNC: 67 U/L (ref 39–117)
ALT SERPL W P-5'-P-CCNC: 12 U/L (ref 1–33)
ANION GAP SERPL CALCULATED.3IONS-SCNC: 8 MMOL/L (ref 5–15)
AST SERPL-CCNC: 17 U/L (ref 1–32)
BILIRUB SERPL-MCNC: 0.3 MG/DL (ref 0–1.2)
BUN SERPL-MCNC: 19 MG/DL (ref 8–23)
BUN/CREAT SERPL: 31.7 (ref 7–25)
CALCIUM SPEC-SCNC: 9.2 MG/DL (ref 8.6–10.5)
CALCIUM SPEC-SCNC: 9.2 MG/DL (ref 8.6–10.5)
CHLORIDE SERPL-SCNC: 102 MMOL/L (ref 98–107)
CO2 SERPL-SCNC: 30 MMOL/L (ref 22–29)
CREAT SERPL-MCNC: 0.6 MG/DL (ref 0.57–1)
EGFRCR SERPLBLD CKD-EPI 2021: 90.9 ML/MIN/1.73
GLOBULIN UR ELPH-MCNC: 2.7 GM/DL
GLUCOSE SERPL-MCNC: 90 MG/DL (ref 65–99)
MAGNESIUM SERPL-MCNC: 2 MG/DL (ref 1.6–2.4)
PHOSPHATE SERPL-MCNC: 3.3 MG/DL (ref 2.5–4.5)
POTASSIUM SERPL-SCNC: 3.7 MMOL/L (ref 3.5–5.2)
PROT SERPL-MCNC: 6.8 G/DL (ref 6–8.5)
SODIUM SERPL-SCNC: 140 MMOL/L (ref 136–145)

## 2023-12-01 PROCEDURE — 82310 ASSAY OF CALCIUM: CPT

## 2023-12-01 PROCEDURE — 25010000002 DENOSUMAB 60 MG/ML SOLUTION PREFILLED SYRINGE: Performed by: INTERNAL MEDICINE

## 2023-12-01 PROCEDURE — 83735 ASSAY OF MAGNESIUM: CPT

## 2023-12-01 PROCEDURE — 82306 VITAMIN D 25 HYDROXY: CPT

## 2023-12-01 PROCEDURE — 80053 COMPREHEN METABOLIC PANEL: CPT

## 2023-12-01 PROCEDURE — 96372 THER/PROPH/DIAG INJ SC/IM: CPT

## 2023-12-01 PROCEDURE — 84100 ASSAY OF PHOSPHORUS: CPT

## 2023-12-01 RX ADMIN — DENOSUMAB 60 MG: 60 INJECTION SUBCUTANEOUS at 10:40

## 2024-01-11 ENCOUNTER — PREP FOR SURGERY (OUTPATIENT)
Dept: OTHER | Facility: HOSPITAL | Age: 79
End: 2024-01-11
Payer: MEDICARE

## 2024-01-11 ENCOUNTER — OFFICE VISIT (OUTPATIENT)
Dept: CARDIOLOGY | Facility: CLINIC | Age: 79
End: 2024-01-11
Payer: MEDICARE

## 2024-01-11 VITALS
DIASTOLIC BLOOD PRESSURE: 70 MMHG | WEIGHT: 128 LBS | SYSTOLIC BLOOD PRESSURE: 130 MMHG | HEIGHT: 62 IN | OXYGEN SATURATION: 96 % | BODY MASS INDEX: 23.55 KG/M2 | HEART RATE: 63 BPM

## 2024-01-11 DIAGNOSIS — I10 BENIGN ESSENTIAL HYPERTENSION: ICD-10-CM

## 2024-01-11 DIAGNOSIS — E78.2 MIXED HYPERLIPIDEMIA: ICD-10-CM

## 2024-01-11 DIAGNOSIS — R94.39 ABNORMAL STRESS TEST: Primary | ICD-10-CM

## 2024-01-11 PROBLEM — M79.10 MYALGIA DUE TO HMG COA REDUCTASE INHIBITOR: Status: ACTIVE | Noted: 2023-05-02

## 2024-01-11 PROBLEM — T46.6X5A MYALGIA DUE TO HMG COA REDUCTASE INHIBITOR: Status: ACTIVE | Noted: 2023-05-02

## 2024-01-11 PROBLEM — E78.5 HYPERLIPIDEMIA: Status: ACTIVE | Noted: 2024-01-11

## 2024-01-11 RX ORDER — SODIUM CHLORIDE 9 MG/ML
40 INJECTION, SOLUTION INTRAVENOUS AS NEEDED
Status: CANCELLED | OUTPATIENT
Start: 2024-01-11

## 2024-01-11 RX ORDER — ASPIRIN 81 MG/1
81 TABLET ORAL DAILY
Status: CANCELLED | OUTPATIENT
Start: 2024-01-12

## 2024-01-11 RX ORDER — ASPIRIN 81 MG/1
81 TABLET ORAL DAILY
COMMUNITY
End: 2024-01-15 | Stop reason: HOSPADM

## 2024-01-11 RX ORDER — SODIUM CHLORIDE 0.9 % (FLUSH) 0.9 %
3 SYRINGE (ML) INJECTION EVERY 12 HOURS SCHEDULED
Status: CANCELLED | OUTPATIENT
Start: 2024-01-11

## 2024-01-11 RX ORDER — ASPIRIN 81 MG/1
324 TABLET, CHEWABLE ORAL ONCE
Status: CANCELLED | OUTPATIENT
Start: 2024-01-11 | End: 2024-01-11

## 2024-01-11 RX ORDER — SODIUM CHLORIDE 0.9 % (FLUSH) 0.9 %
10 SYRINGE (ML) INJECTION AS NEEDED
Status: CANCELLED | OUTPATIENT
Start: 2024-01-11

## 2024-01-11 NOTE — H&P (VIEW-ONLY)
[facility] - CARDIOLOGY  New Patient Initial Outpatient Evaluation    Primary Care Physician: Colt Beckham MD    Subjective     Chief Complaint/Reason for Referral: Abnormal stress test    History of Present Illness    Ms. Germaine Golden was kindly referred to me by Dr. Beckham for the above. He treats her for hypertension, and hyperlipidemia as vascular risk factors. She has had intolerance to numerous different statin medications including pravastatin, and simvastatin.    Ms. Germaine Golden comes to the clinic today stating that she likes to walk.  She reports that when she would start her walk, she would feel a burning, and pressure in her chest, which started over 1 year ago. She states that she has not been doing much walking lately because she does not feel like it. She reports that she is feeling those similar kinds of symptoms occasionally when she is doing activities. She states that it does not last very long at all. She reports that it occurs when she is exertional, and it has never happened while at rest. She reports that the other day it occurred, she was cleaning up all her [place] decorations, putting up the boxes, and getting them stored away when the burning sensation was noticed. She states that it was enough when it happened to make her stop doing what she was doing, and just rest for a little bit. She reports that it was almost immediately for the symptoms to go away.     She states that she feels pressure across the chest. She denies that the pressure does not move anywhere up to her neck, down her arm, or toward her back. She states that it does not make her feel winded at the time, sick to her stomach, or break out in a sweat. She reports that she could tell that she had some pressures when she did the treadmill stress test a few months ago.  She states that overall, chest burning is not getting worse in any respect in terms of being more intense when they happen, last longer, or  starting to happen at lesser activity level. She notes that the frequency has increased over the last year.     She reports that she had a heart catheterization years ago because she was having a lot of pain that was performed through her groin. She states that she must take a pill for indigestion.    She states that she would like to have the heart cauterization done as soon as possible, but mentions that she was supposed to see Dr. Beckham on 01/15/2024 to have blood work completed to check her iron levels after starting a new prescription for her iron levels.         Review of Systems   Constitutional: Negative for malaise/fatigue.   Cardiovascular:  Negative for chest pain, claudication, dyspnea on exertion, leg swelling, near-syncope, orthopnea, palpitations, paroxysmal nocturnal dyspnea and syncope.        Chest pressure as per the HPI.   Respiratory:  Positive for shortness of breath.    Hematologic/Lymphatic: Does not bruise/bleed easily.      Otherwise complete ROS reviewed and negative except as mentioned in the HPI.      Past Medical History:   Past Medical History:   Diagnosis Date    Abnormal ECG     Colon polyp     GERD (gastroesophageal reflux disease)     Hyperlipidemia     Hypertension     Hypothyroidism        Past Surgical History:  Past Surgical History:   Procedure Laterality Date    CARDIAC CATHETERIZATION      COLONOSCOPY  12/31/2013    COLONOSCOPY N/A 03/25/2022    Procedure: COLONOSCOPY WITH ANESTHESIA;  Surgeon: Joao Saleem MD;  Location: [facility] ENDOSCOPY;  Service: Gastroenterology;  Laterality: N/A;  pre hx polyp, family hx of colon ca  post polyp  Colt Beckham MD    HYSTERECTOMY         Family History: family history includes Colon cancer in her paternal aunt; Colon polyps in her son; Heart disease in her maternal grandmother.    Social History:  reports that she quit smoking about 39 years ago. Her smoking use included cigarettes. She has never used smokeless tobacco.  She reports current alcohol use. She reports that she does not use drugs.    Medications:  Prior to Admission medications    Medication Sig Start Date End Date Taking? Authorizing Provider   aspirin 81 MG EC tablet Take 1 tablet by mouth Daily.   Yes Cristian Yanez MD   atenolol (TENORMIN) 25 MG tablet atenolol 25 mg tablet   TAKE 1 TABLET DAILY 3/30/21  Yes Cristian Yanez MD   Biotin 10 MG capsule    Yes Cristian Yanez MD   denosumab (Prolia) 60 MG/ML solution syringe Prolia 60 mg/mL subcutaneous syringe   Inject 1ml every 6 months   Yes Cristian Yanez MD   ezetimibe (ZETIA) 10 MG tablet Taking once every 2-3 days 10/30/21  Yes Cristian Yanez MD   ferrous sulfate 325 (65 FE) MG tablet Every 12 (Twelve) Hours.   Yes Cristian Yanez MD   levothyroxine sodium (TIROSINT) 100 MCG capsule Take 1 capsule by mouth Daily.   Yes Cristian Yanez MD   lisinopril (PRINIVIL,ZESTRIL) 20 MG tablet lisinopril 20 mg tablet   Yes Cristian Yanez MD   omeprazole (priLOSEC) 40 MG capsule  12/23/16  Yes Cristian Yanez MD   oxybutynin XL (DITROPAN-XL) 10 MG 24 hr tablet 5 mg 2 (Two) Times a Day. 2/10/17  Yes Cristian Yanez MD   promethazine-dextromethorphan (PROMETHAZINE-DM) 6.25-15 MG/5ML syrup As Needed.   Yes Cristian Yanez MD   vitamin D (ERGOCALCIFEROL) 11294 UNITS capsule capsule Every 7 (Seven) Days. 12/26/16  Yes Cristian Yanez MD   ibuprofen (ADVIL,MOTRIN) 200 MG tablet Take 1 tablet by mouth Every 6 (Six) Hours.  1/11/24  Cristian Yanez MD   pitavastatin calcium (LIVALO) 1 MG tablet tablet Livalo 1 mg tablet  1/11/24  Cristian Yanez MD   SYNTHROID 88 MCG tablet  11/29/16 1/11/24  Cristian Yanez MD   terbinafine (lamiSIL) 250 MG tablet terbinafine HCl 250 mg tablet  1/11/24  Cristian Yanez MD     Allergies:  Allergies   Allergen Reactions    Alendronate Myalgia    Levaquin [Levofloxacin] Myalgia    Pravastatin Myalgia     "Simvastatin Myalgia    Ampicillin Rash    Percodan [Oxycodone-Aspirin] Rash       Objective     Vital Signs: /70   Pulse 63   Ht 157.5 cm (62\")   Wt 58.1 kg (128 lb)   SpO2 96%   BMI 23.41 kg/m²     Vitals and nursing note reviewed.   Constitutional:       General: Not in acute distress.     Appearance: Not in distress.   Neck:      Vascular: No JVD or JVR. JVD normal.   Pulmonary:      Effort: Pulmonary effort is normal.      Breath sounds: Normal breath sounds.   Cardiovascular:      Normal rate. Regular rhythm.      Murmurs: There is no murmur.      No gallop.  No rub.   Pulses:     Intact distal pulses.   Edema:     Peripheral edema absent.   Skin:     General: Skin is warm and dry.   Neurological:      Mental Status: Alert, oriented to person, place, and time and oriented to person, place and time.     Results Reviewed:      ECG 12 Lead    Date/Time: 1/11/2024 2:01 PM  Performed by: Shayne Mcallister MD    Authorized by: Shayne Mcallister MD  Comparison: not compared with previous ECG   Rhythm: sinus rhythm  BPM: 63  Q waves: V1 and V2    Other findings: non-specific ST-T wave changes  Other findings comments: Low voltage QRS  Clinical impression comment: Borderline ECG.      Independent review of imaging: Since the stress test in question was interpreted by a different cardiologist, I reviewed all the raw data myself today, with my interpretation to follow: Baseline ECG is relatively unremarkable with some nonspecific T wave abnormalities across the precordial leads. At about 3.5 minutes into exercise (just into stage 2), horizontal ST depression started in the inferior leads and then a minute later was evident across the precordial leads V3 through V5. ST depression was less prominent, only 1 minute into recovery. There still appeared to be almost 1 mm in the precordial leads V4 through V6, and was essentially resolved 3 to 4 minutes into remission.  - From a clinical perspective, she exercised 4 " "minutes, and 10 seconds on standard Abhijeet protocol, and achieved target heart rate, and 7.5 mets workload according to documentation. She did not have chest pain before or after the test.  - I did review the echocardiographic images, which appear to be of good quality, the resting wall motion is normal, as LVEF.  - Post stress echocardiographic images show appropriate wall motion response of all wall segments.    Labs reviewed by referring provider: which include a lipid profile from 10/23/2023 showing total cholesterol 257 mg/dL,  mg/dL, and HDL 55 mg/dL. CMP is unremarkable. CBC was notable for a hemoglobin of 10.9 with normal platelets. TSH was also within normal limits.      No results found for: \"CHOL\", \"TRIG\", \"HDL\", \"VLDL\", \"LDLHDL\"  No results found for: \"HGBA1C\"    Results for orders placed during the hospital encounter of 11/14/23    Adult Stress Echo W/ Cont or Stress Agent if Necessary Per Protocol    Interpretation Summary    Overall, this is an intermediate risk test for ischemia.  If there is a very high index of suspicion for coronary artery disease in this patient, consider further work-up including referral to cardiology.    Steel treadmill score of -1    Positive ischemic changes on EKG during stress including 1 mm ST depressions in the inferior lateral leads    No significant ischemic symptoms during stress    Good functional capacity at 7.5 metabolic equivalents    Appropriate heart rate response to stress, hypertensive blood pressure response to stress    Normal stress echo consistent with a low risk study for myocardial ischemia.    Left ventricular systolic function is normal. Left ventricular ejection fraction appears to be 61 - 65%.      Assessment / Plan        Problem List Items Addressed This Visit (all established, stable except where otherwise noted)         Cardiac and Vasculature    Benign essential hypertension    Hyperlipidemia    Overview     intolerant to multiple statins " in the past; tried low dose rosuvastatin and zetia and intolerant due to joint pains  intolerant to multiple statins in the past; tried low dose rosuvastatin and zetia and intolerant due to joint pains         Abnormal stress test - Primary: new, further work-up planned       Recommendations/plans:    We carefully reviewed all of her presenting symptoms as well as each component of her stress test. She has had over 1 year of typical anginal symptoms that first began with walking, but now are happening with lesser activities, she is no longer walking. She did have a stress echocardiogram that I reviewed in detail today, which did show ischemic EKG changes, and, though not documented this way, she does report she had chest pressure with activity there. As such, I do think cardiac catheterization is recommended for definitive diagnosis. She does have a very small palpable radial artery, but I advised that I thought we could at least try the procedure through radial arterial approach, and if not successful, then convert to femoral approach. She would like to proceed as soon as possible, so I will place orders to have this done next Monday, 01/15/2023. I have advised that she continue aspirin in the meantime. Unfortunately, she is not tolerating numerous different statins, so if ultimately proved to have coronary disease, then she would be an excellent candidate for PSK 9 inhibitor therapy as her latest LDL is still significantly elevated at 177 mg/dL.     Thank you so much for the referral.       Transcribed from ambient dictation for Shayne Mcallister MD by Lisa Paniagua.  01/11/24   17:27 CST    Patient or patient representative verbalized consent to the visit recording.    I Shayne Mcallister MD have personally performed the services described in this document as scribed by the above individual, and it is both accurate and complete.   I have edited each component as needed.    Shayne Mcallister MD  1/13/2024  22:40  CST

## 2024-01-11 NOTE — PROGRESS NOTES
[facility] - CARDIOLOGY  New Patient Initial Outpatient Evaluation    Primary Care Physician: Colt Beckham MD    Subjective     Chief Complaint/Reason for Referral: Abnormal stress test    History of Present Illness    Ms. Germaine Golden was kindly referred to me by Dr. Beckham for the above. He treats her for hypertension, and hyperlipidemia as vascular risk factors. She has had intolerance to numerous different statin medications including pravastatin, and simvastatin.    Ms. Germaine Golden comes to the clinic today stating that she likes to walk.  She reports that when she would start her walk, she would feel a burning, and pressure in her chest, which started over 1 year ago. She states that she has not been doing much walking lately because she does not feel like it. She reports that she is feeling those similar kinds of symptoms occasionally when she is doing activities. She states that it does not last very long at all. She reports that it occurs when she is exertional, and it has never happened while at rest. She reports that the other day it occurred, she was cleaning up all her [place] decorations, putting up the boxes, and getting them stored away when the burning sensation was noticed. She states that it was enough when it happened to make her stop doing what she was doing, and just rest for a little bit. She reports that it was almost immediately for the symptoms to go away.     She states that she feels pressure across the chest. She denies that the pressure does not move anywhere up to her neck, down her arm, or toward her back. She states that it does not make her feel winded at the time, sick to her stomach, or break out in a sweat. She reports that she could tell that she had some pressures when she did the treadmill stress test a few months ago.  She states that overall, chest burning is not getting worse in any respect in terms of being more intense when they happen, last longer, or  starting to happen at lesser activity level. She notes that the frequency has increased over the last year.     She reports that she had a heart catheterization years ago because she was having a lot of pain that was performed through her groin. She states that she must take a pill for indigestion.    She states that she would like to have the heart cauterization done as soon as possible, but mentions that she was supposed to see Dr. Beckham on 01/15/2024 to have blood work completed to check her iron levels after starting a new prescription for her iron levels.         Review of Systems   Constitutional: Negative for malaise/fatigue.   Cardiovascular:  Negative for chest pain, claudication, dyspnea on exertion, leg swelling, near-syncope, orthopnea, palpitations, paroxysmal nocturnal dyspnea and syncope.        Chest pressure as per the HPI.   Respiratory:  Positive for shortness of breath.    Hematologic/Lymphatic: Does not bruise/bleed easily.      Otherwise complete ROS reviewed and negative except as mentioned in the HPI.      Past Medical History:   Past Medical History:   Diagnosis Date    Abnormal ECG     Colon polyp     GERD (gastroesophageal reflux disease)     Hyperlipidemia     Hypertension     Hypothyroidism        Past Surgical History:  Past Surgical History:   Procedure Laterality Date    CARDIAC CATHETERIZATION      COLONOSCOPY  12/31/2013    COLONOSCOPY N/A 03/25/2022    Procedure: COLONOSCOPY WITH ANESTHESIA;  Surgeon: Joao Saleem MD;  Location: [facility] ENDOSCOPY;  Service: Gastroenterology;  Laterality: N/A;  pre hx polyp, family hx of colon ca  post polyp  Colt Beckham MD    HYSTERECTOMY         Family History: family history includes Colon cancer in her paternal aunt; Colon polyps in her son; Heart disease in her maternal grandmother.    Social History:  reports that she quit smoking about 39 years ago. Her smoking use included cigarettes. She has never used smokeless tobacco.  She reports current alcohol use. She reports that she does not use drugs.    Medications:  Prior to Admission medications    Medication Sig Start Date End Date Taking? Authorizing Provider   aspirin 81 MG EC tablet Take 1 tablet by mouth Daily.   Yes Cristian Yanez MD   atenolol (TENORMIN) 25 MG tablet atenolol 25 mg tablet   TAKE 1 TABLET DAILY 3/30/21  Yes Cristian Yanez MD   Biotin 10 MG capsule    Yes Cristian Yanez MD   denosumab (Prolia) 60 MG/ML solution syringe Prolia 60 mg/mL subcutaneous syringe   Inject 1ml every 6 months   Yes Cristian Yanez MD   ezetimibe (ZETIA) 10 MG tablet Taking once every 2-3 days 10/30/21  Yes Cristian Yanez MD   ferrous sulfate 325 (65 FE) MG tablet Every 12 (Twelve) Hours.   Yes Cristian Yanez MD   levothyroxine sodium (TIROSINT) 100 MCG capsule Take 1 capsule by mouth Daily.   Yes Cristian Yanez MD   lisinopril (PRINIVIL,ZESTRIL) 20 MG tablet lisinopril 20 mg tablet   Yes Cristian Yanez MD   omeprazole (priLOSEC) 40 MG capsule  12/23/16  Yes Cristian Yanez MD   oxybutynin XL (DITROPAN-XL) 10 MG 24 hr tablet 5 mg 2 (Two) Times a Day. 2/10/17  Yes Cristian Yanez MD   promethazine-dextromethorphan (PROMETHAZINE-DM) 6.25-15 MG/5ML syrup As Needed.   Yes Cristian Yanez MD   vitamin D (ERGOCALCIFEROL) 62190 UNITS capsule capsule Every 7 (Seven) Days. 12/26/16  Yes Cristian Yanez MD   ibuprofen (ADVIL,MOTRIN) 200 MG tablet Take 1 tablet by mouth Every 6 (Six) Hours.  1/11/24  Cristian Yanez MD   pitavastatin calcium (LIVALO) 1 MG tablet tablet Livalo 1 mg tablet  1/11/24  Cristian Yanez MD   SYNTHROID 88 MCG tablet  11/29/16 1/11/24  Cristian Yanez MD   terbinafine (lamiSIL) 250 MG tablet terbinafine HCl 250 mg tablet  1/11/24  Cristian Yanez MD     Allergies:  Allergies   Allergen Reactions    Alendronate Myalgia    Levaquin [Levofloxacin] Myalgia    Pravastatin Myalgia     "Simvastatin Myalgia    Ampicillin Rash    Percodan [Oxycodone-Aspirin] Rash       Objective     Vital Signs: /70   Pulse 63   Ht 157.5 cm (62\")   Wt 58.1 kg (128 lb)   SpO2 96%   BMI 23.41 kg/m²     Vitals and nursing note reviewed.   Constitutional:       General: Not in acute distress.     Appearance: Not in distress.   Neck:      Vascular: No JVD or JVR. JVD normal.   Pulmonary:      Effort: Pulmonary effort is normal.      Breath sounds: Normal breath sounds.   Cardiovascular:      Normal rate. Regular rhythm.      Murmurs: There is no murmur.      No gallop.  No rub.   Pulses:     Intact distal pulses.   Edema:     Peripheral edema absent.   Skin:     General: Skin is warm and dry.   Neurological:      Mental Status: Alert, oriented to person, place, and time and oriented to person, place and time.     Results Reviewed:      ECG 12 Lead    Date/Time: 1/11/2024 2:01 PM  Performed by: Shayne Mcallister MD    Authorized by: Shayne Mcallister MD  Comparison: not compared with previous ECG   Rhythm: sinus rhythm  BPM: 63  Q waves: V1 and V2    Other findings: non-specific ST-T wave changes  Other findings comments: Low voltage QRS  Clinical impression comment: Borderline ECG.      Independent review of imaging: Since the stress test in question was interpreted by a different cardiologist, I reviewed all the raw data myself today, with my interpretation to follow: Baseline ECG is relatively unremarkable with some nonspecific T wave abnormalities across the precordial leads. At about 3.5 minutes into exercise (just into stage 2), horizontal ST depression started in the inferior leads and then a minute later was evident across the precordial leads V3 through V5. ST depression was less prominent, only 1 minute into recovery. There still appeared to be almost 1 mm in the precordial leads V4 through V6, and was essentially resolved 3 to 4 minutes into remission.  - From a clinical perspective, she exercised 4 " "minutes, and 10 seconds on standard Abhijeet protocol, and achieved target heart rate, and 7.5 mets workload according to documentation. She did not have chest pain before or after the test.  - I did review the echocardiographic images, which appear to be of good quality, the resting wall motion is normal, as LVEF.  - Post stress echocardiographic images show appropriate wall motion response of all wall segments.    Labs reviewed by referring provider: which include a lipid profile from 10/23/2023 showing total cholesterol 257 mg/dL,  mg/dL, and HDL 55 mg/dL. CMP is unremarkable. CBC was notable for a hemoglobin of 10.9 with normal platelets. TSH was also within normal limits.      No results found for: \"CHOL\", \"TRIG\", \"HDL\", \"VLDL\", \"LDLHDL\"  No results found for: \"HGBA1C\"    Results for orders placed during the hospital encounter of 11/14/23    Adult Stress Echo W/ Cont or Stress Agent if Necessary Per Protocol    Interpretation Summary    Overall, this is an intermediate risk test for ischemia.  If there is a very high index of suspicion for coronary artery disease in this patient, consider further work-up including referral to cardiology.    Steel treadmill score of -1    Positive ischemic changes on EKG during stress including 1 mm ST depressions in the inferior lateral leads    No significant ischemic symptoms during stress    Good functional capacity at 7.5 metabolic equivalents    Appropriate heart rate response to stress, hypertensive blood pressure response to stress    Normal stress echo consistent with a low risk study for myocardial ischemia.    Left ventricular systolic function is normal. Left ventricular ejection fraction appears to be 61 - 65%.      Assessment / Plan        Problem List Items Addressed This Visit (all established, stable except where otherwise noted)         Cardiac and Vasculature    Benign essential hypertension    Hyperlipidemia    Overview     intolerant to multiple statins " in the past; tried low dose rosuvastatin and zetia and intolerant due to joint pains  intolerant to multiple statins in the past; tried low dose rosuvastatin and zetia and intolerant due to joint pains         Abnormal stress test - Primary: new, further work-up planned       Recommendations/plans:    We carefully reviewed all of her presenting symptoms as well as each component of her stress test. She has had over 1 year of typical anginal symptoms that first began with walking, but now are happening with lesser activities, she is no longer walking. She did have a stress echocardiogram that I reviewed in detail today, which did show ischemic EKG changes, and, though not documented this way, she does report she had chest pressure with activity there. As such, I do think cardiac catheterization is recommended for definitive diagnosis. She does have a very small palpable radial artery, but I advised that I thought we could at least try the procedure through radial arterial approach, and if not successful, then convert to femoral approach. She would like to proceed as soon as possible, so I will place orders to have this done next Monday, 01/15/2023. I have advised that she continue aspirin in the meantime. Unfortunately, she is not tolerating numerous different statins, so if ultimately proved to have coronary disease, then she would be an excellent candidate for PSK 9 inhibitor therapy as her latest LDL is still significantly elevated at 177 mg/dL.     Thank you so much for the referral.       Transcribed from ambient dictation for Shayne Mcallister MD by Lisa Paniagua.  01/11/24   17:27 CST    Patient or patient representative verbalized consent to the visit recording.    I Shayne Mcallister MD have personally performed the services described in this document as scribed by the above individual, and it is both accurate and complete.   I have edited each component as needed.    Shayne Mcallister MD  1/13/2024  22:40  CST

## 2024-01-15 ENCOUNTER — HOSPITAL ENCOUNTER (OUTPATIENT)
Facility: HOSPITAL | Age: 79
Setting detail: HOSPITAL OUTPATIENT SURGERY
Discharge: HOME | End: 2024-01-15
Attending: INTERNAL MEDICINE | Admitting: INTERNAL MEDICINE
Payer: MEDICARE

## 2024-01-15 VITALS
TEMPERATURE: 97.3 F | HEIGHT: 62 IN | RESPIRATION RATE: 13 BRPM | SYSTOLIC BLOOD PRESSURE: 147 MMHG | OXYGEN SATURATION: 100 % | BODY MASS INDEX: 23.19 KG/M2 | HEART RATE: 61 BPM | WEIGHT: 126 LBS | DIASTOLIC BLOOD PRESSURE: 60 MMHG

## 2024-01-15 DIAGNOSIS — R94.39 ABNORMAL STRESS TEST: ICD-10-CM

## 2024-01-15 LAB
ANION GAP SERPL CALCULATED.3IONS-SCNC: 9 MMOL/L (ref 5–15)
BUN SERPL-MCNC: 20 MG/DL (ref 8–23)
BUN/CREAT SERPL: 36.4 (ref 7–25)
CALCIUM SPEC-SCNC: 9 MG/DL (ref 8.6–10.5)
CHLORIDE SERPL-SCNC: 100 MMOL/L (ref 98–107)
CO2 SERPL-SCNC: 30 MMOL/L (ref 22–29)
CREAT SERPL-MCNC: 0.55 MG/DL (ref 0.57–1)
DEPRECATED RDW RBC AUTO: 48.5 FL (ref 37–54)
EGFRCR SERPLBLD CKD-EPI 2021: 92.8 ML/MIN/1.73
ERYTHROCYTE [DISTWIDTH] IN BLOOD BY AUTOMATED COUNT: 15.6 % (ref 12.3–15.4)
GLUCOSE SERPL-MCNC: 107 MG/DL (ref 65–99)
HCT VFR BLD AUTO: 41.4 % (ref 34–46.6)
HGB BLD-MCNC: 12.5 G/DL (ref 12–15.9)
MCH RBC QN AUTO: 25.8 PG (ref 26.6–33)
MCHC RBC AUTO-ENTMCNC: 30.2 G/DL (ref 31.5–35.7)
MCV RBC AUTO: 85.5 FL (ref 79–97)
PLATELET # BLD AUTO: 371 10*3/MM3 (ref 140–450)
PMV BLD AUTO: 10.7 FL (ref 6–12)
POTASSIUM SERPL-SCNC: 3.6 MMOL/L (ref 3.5–5.2)
RBC # BLD AUTO: 4.84 10*6/MM3 (ref 3.77–5.28)
SODIUM SERPL-SCNC: 139 MMOL/L (ref 136–145)
WBC NRBC COR # BLD AUTO: 7.2 10*3/MM3 (ref 3.4–10.8)

## 2024-01-15 PROCEDURE — 93458 L HRT ARTERY/VENTRICLE ANGIO: CPT | Performed by: INTERNAL MEDICINE

## 2024-01-15 PROCEDURE — 99152 MOD SED SAME PHYS/QHP 5/>YRS: CPT | Performed by: INTERNAL MEDICINE

## 2024-01-15 PROCEDURE — 25010000002 HEPARIN (PORCINE) 2000-0.9 UNIT/L-% SOLUTION: Performed by: INTERNAL MEDICINE

## 2024-01-15 PROCEDURE — 25010000002 HEPARIN (PORCINE) PER 1000 UNITS: Performed by: INTERNAL MEDICINE

## 2024-01-15 PROCEDURE — 25010000002 HEPARIN (PORCINE) 1000-0.9 UT/500ML-% SOLUTION: Performed by: INTERNAL MEDICINE

## 2024-01-15 PROCEDURE — 25010000002 DIPHENHYDRAMINE PER 50 MG: Performed by: INTERNAL MEDICINE

## 2024-01-15 PROCEDURE — 25010000002 MIDAZOLAM PER 1 MG: Performed by: INTERNAL MEDICINE

## 2024-01-15 PROCEDURE — 80048 BASIC METABOLIC PNL TOTAL CA: CPT | Performed by: INTERNAL MEDICINE

## 2024-01-15 PROCEDURE — 25510000001 IOPAMIDOL PER 1 ML: Performed by: INTERNAL MEDICINE

## 2024-01-15 PROCEDURE — C1769 GUIDE WIRE: HCPCS | Performed by: INTERNAL MEDICINE

## 2024-01-15 PROCEDURE — 85027 COMPLETE CBC AUTOMATED: CPT | Performed by: INTERNAL MEDICINE

## 2024-01-15 PROCEDURE — 25010000002 FENTANYL CITRATE (PF) 50 MCG/ML SOLUTION: Performed by: INTERNAL MEDICINE

## 2024-01-15 PROCEDURE — 25810000003 SODIUM CHLORIDE 0.9 % SOLUTION: Performed by: INTERNAL MEDICINE

## 2024-01-15 PROCEDURE — 99153 MOD SED SAME PHYS/QHP EA: CPT | Performed by: INTERNAL MEDICINE

## 2024-01-15 PROCEDURE — C1894 INTRO/SHEATH, NON-LASER: HCPCS | Performed by: INTERNAL MEDICINE

## 2024-01-15 RX ORDER — VERAPAMIL HYDROCHLORIDE 2.5 MG/ML
INJECTION, SOLUTION INTRAVENOUS
Status: DISCONTINUED | OUTPATIENT
Start: 2024-01-15 | End: 2024-01-15 | Stop reason: HOSPADM

## 2024-01-15 RX ORDER — ACETAMINOPHEN 325 MG/1
650 TABLET ORAL EVERY 4 HOURS PRN
Status: CANCELLED | OUTPATIENT
Start: 2024-01-15

## 2024-01-15 RX ORDER — SODIUM CHLORIDE 0.9 % (FLUSH) 0.9 %
10 SYRINGE (ML) INJECTION AS NEEDED
Status: DISCONTINUED | OUTPATIENT
Start: 2024-01-15 | End: 2024-01-15 | Stop reason: HOSPADM

## 2024-01-15 RX ORDER — ASPIRIN 81 MG/1
324 TABLET, CHEWABLE ORAL ONCE
Status: COMPLETED | OUTPATIENT
Start: 2024-01-15 | End: 2024-01-15

## 2024-01-15 RX ORDER — HEPARIN SODIUM 200 [USP'U]/100ML
INJECTION, SOLUTION INTRAVENOUS
Status: DISCONTINUED | OUTPATIENT
Start: 2024-01-15 | End: 2024-01-15 | Stop reason: HOSPADM

## 2024-01-15 RX ORDER — SODIUM CHLORIDE 9 MG/ML
100 INJECTION, SOLUTION INTRAVENOUS CONTINUOUS
Status: CANCELLED | OUTPATIENT
Start: 2024-01-15

## 2024-01-15 RX ORDER — SODIUM CHLORIDE 9 MG/ML
40 INJECTION, SOLUTION INTRAVENOUS AS NEEDED
Status: DISCONTINUED | OUTPATIENT
Start: 2024-01-15 | End: 2024-01-15 | Stop reason: HOSPADM

## 2024-01-15 RX ORDER — DIPHENHYDRAMINE HYDROCHLORIDE 50 MG/ML
INJECTION INTRAMUSCULAR; INTRAVENOUS
Status: DISCONTINUED | OUTPATIENT
Start: 2024-01-15 | End: 2024-01-15 | Stop reason: HOSPADM

## 2024-01-15 RX ORDER — FENTANYL CITRATE 50 UG/ML
INJECTION, SOLUTION INTRAMUSCULAR; INTRAVENOUS
Status: DISCONTINUED | OUTPATIENT
Start: 2024-01-15 | End: 2024-01-15 | Stop reason: HOSPADM

## 2024-01-15 RX ORDER — ASPIRIN 81 MG/1
TABLET, CHEWABLE ORAL
Status: COMPLETED
Start: 2024-01-15 | End: 2024-01-15

## 2024-01-15 RX ORDER — SODIUM CHLORIDE 0.9 % (FLUSH) 0.9 %
3 SYRINGE (ML) INJECTION EVERY 12 HOURS SCHEDULED
Status: DISCONTINUED | OUTPATIENT
Start: 2024-01-15 | End: 2024-01-15 | Stop reason: HOSPADM

## 2024-01-15 RX ORDER — NITROGLYCERIN 0.4 MG/1
0.4 TABLET SUBLINGUAL
Status: CANCELLED | OUTPATIENT
Start: 2024-01-15

## 2024-01-15 RX ORDER — MIDAZOLAM HYDROCHLORIDE 1 MG/ML
INJECTION INTRAMUSCULAR; INTRAVENOUS
Status: DISCONTINUED | OUTPATIENT
Start: 2024-01-15 | End: 2024-01-15 | Stop reason: HOSPADM

## 2024-01-15 RX ORDER — ASPIRIN 81 MG/1
81 TABLET ORAL DAILY
Status: DISCONTINUED | OUTPATIENT
Start: 2024-01-16 | End: 2024-01-15 | Stop reason: HOSPADM

## 2024-01-15 RX ORDER — HEPARIN SODIUM 1000 [USP'U]/ML
INJECTION, SOLUTION INTRAVENOUS; SUBCUTANEOUS
Status: DISCONTINUED | OUTPATIENT
Start: 2024-01-15 | End: 2024-01-15 | Stop reason: HOSPADM

## 2024-01-15 RX ORDER — LIDOCAINE HYDROCHLORIDE 20 MG/ML
INJECTION, SOLUTION INFILTRATION; PERINEURAL
Status: DISCONTINUED | OUTPATIENT
Start: 2024-01-15 | End: 2024-01-15 | Stop reason: HOSPADM

## 2024-01-15 RX ADMIN — ASPIRIN 324 MG: 81 TABLET, CHEWABLE ORAL at 07:34

## 2024-01-15 RX ADMIN — SODIUM CHLORIDE 174.3 ML: 9 INJECTION, SOLUTION INTRAVENOUS at 07:35

## 2024-10-30 DIAGNOSIS — M85.80 OSTEOPENIA, UNSPECIFIED LOCATION: Primary | ICD-10-CM

## 2024-11-01 ENCOUNTER — TRANSCRIBE ORDERS (OUTPATIENT)
Dept: LAB | Facility: HOSPITAL | Age: 79
End: 2024-11-01
Payer: MEDICARE

## 2024-11-07 ENCOUNTER — LAB (OUTPATIENT)
Dept: LAB | Facility: HOSPITAL | Age: 79
End: 2024-11-07
Payer: MEDICARE

## 2024-11-07 ENCOUNTER — INFUSION (OUTPATIENT)
Dept: ONCOLOGY | Facility: HOSPITAL | Age: 79
End: 2024-11-07
Payer: MEDICARE

## 2024-11-07 VITALS
OXYGEN SATURATION: 100 % | WEIGHT: 125 LBS | DIASTOLIC BLOOD PRESSURE: 55 MMHG | HEIGHT: 62 IN | BODY MASS INDEX: 23 KG/M2 | RESPIRATION RATE: 18 BRPM | TEMPERATURE: 97.3 F | SYSTOLIC BLOOD PRESSURE: 123 MMHG | HEART RATE: 77 BPM

## 2024-11-07 DIAGNOSIS — M85.80 OSTEOPENIA, UNSPECIFIED LOCATION: ICD-10-CM

## 2024-11-07 DIAGNOSIS — E78.49 OTHER HYPERLIPIDEMIA: Primary | ICD-10-CM

## 2024-11-07 LAB
25(OH)D3 SERPL-MCNC: 58 NG/ML (ref 30–100)
ALBUMIN SERPL-MCNC: 4.1 G/DL (ref 3.5–5.2)
ALBUMIN/GLOB SERPL: 1.6 G/DL
ALP SERPL-CCNC: 84 U/L (ref 39–117)
ALT SERPL W P-5'-P-CCNC: 23 U/L (ref 1–33)
ANION GAP SERPL CALCULATED.3IONS-SCNC: 7 MMOL/L (ref 5–15)
AST SERPL-CCNC: 16 U/L (ref 1–32)
BILIRUB SERPL-MCNC: 0.2 MG/DL (ref 0–1.2)
BUN SERPL-MCNC: 26 MG/DL (ref 8–23)
BUN/CREAT SERPL: 40 (ref 7–25)
CALCIUM SPEC-SCNC: 9.2 MG/DL (ref 8.6–10.5)
CHLORIDE SERPL-SCNC: 96 MMOL/L (ref 98–107)
CO2 SERPL-SCNC: 32 MMOL/L (ref 22–29)
CREAT SERPL-MCNC: 0.65 MG/DL (ref 0.57–1)
EGFRCR SERPLBLD CKD-EPI 2021: 88.6 ML/MIN/1.73
GLOBULIN UR ELPH-MCNC: 2.5 GM/DL
GLUCOSE SERPL-MCNC: 104 MG/DL (ref 65–99)
MAGNESIUM SERPL-MCNC: 1.8 MG/DL (ref 1.6–2.4)
PHOSPHATE SERPL-MCNC: 3.7 MG/DL (ref 2.5–4.5)
POTASSIUM SERPL-SCNC: 3.6 MMOL/L (ref 3.5–5.2)
PROT SERPL-MCNC: 6.6 G/DL (ref 6–8.5)
SODIUM SERPL-SCNC: 135 MMOL/L (ref 136–145)

## 2024-11-07 PROCEDURE — 36415 COLL VENOUS BLD VENIPUNCTURE: CPT

## 2024-11-07 PROCEDURE — 83735 ASSAY OF MAGNESIUM: CPT

## 2024-11-07 PROCEDURE — 80053 COMPREHEN METABOLIC PANEL: CPT

## 2024-11-07 PROCEDURE — 84100 ASSAY OF PHOSPHORUS: CPT

## 2024-11-07 PROCEDURE — G0463 HOSPITAL OUTPT CLINIC VISIT: HCPCS

## 2024-11-07 PROCEDURE — 82306 VITAMIN D 25 HYDROXY: CPT

## 2024-11-07 RX ORDER — OLMESARTAN MEDOXOMIL, AMLODIPINE AND HYDROCHLOROTHIAZIDE TABLET 40/5/25 MG 40; 5; 25 MG/1; MG/1; MG/1
TABLET ORAL
COMMUNITY
Start: 2024-09-10

## 2024-11-07 NOTE — PROGRESS NOTES
Patient here for Prolia, patient stated she is to have a crown placed next week. Called Dr Beckham's office and spoke with Lynda . Verbal orders from Guero HILL given to hold Prolis and push out a month after crown. Patient aware and rescheduled.

## (undated) DEVICE — SOLIDIFIER LIQUI LOC PLUS 2000CC

## (undated) DEVICE — Device: Brand: DEFENDO AIR/WATER/SUCTION AND BIOPSY VALVE

## (undated) DEVICE — PK CATH CARD 30 CA/4

## (undated) DEVICE — Device

## (undated) DEVICE — CATH F5 INF JL 3.5 100CM: Brand: INFINITI

## (undated) DEVICE — GW STARTER FXD CORE J .035 3X260CM 3MM

## (undated) DEVICE — CANN NASL ETCO2 LO/FLO A/

## (undated) DEVICE — RADIFOCUS OPTITORQUE ANGIOGRAPHIC CATHETER: Brand: OPTITORQUE

## (undated) DEVICE — SENSR O2 OXIMAX FNGR A/ 18IN NONSTR

## (undated) DEVICE — SOL IRR NACL 0.9PCT BT 1000ML

## (undated) DEVICE — GW ZIPWIRE STD ANGL .035IN 150CM

## (undated) DEVICE — PAD, DEFIB, ADULT, RADIOTRANS, PHYSIO: Brand: MEDLINE

## (undated) DEVICE — GLIDESHEATH SLENDER STAINLESS STEEL KIT: Brand: GLIDESHEATH SLENDER

## (undated) DEVICE — DRSNG SURESITE WNDW 4X4.5

## (undated) DEVICE — MASK,OXYGEN,MED CONC,ADLT,7' TUB, UC: Brand: PENDING

## (undated) DEVICE — Device: Brand: MEDEX

## (undated) DEVICE — SNAR POLYP SENSATION MICRO OVL 13 240X40

## (undated) DEVICE — TBG SMPL FLTR LINE NASL 02/C02 A/ BX/100

## (undated) DEVICE — THE SINGLE USE ETRAP – POLYP TRAP IS USED FOR SUCTION RETRIEVAL OF ENDOSCOPICALLY REMOVED POLYPS.: Brand: ETRAP

## (undated) DEVICE — CUFF,BP,DISP,1 TUBE,ADULT,HP: Brand: MEDLINE

## (undated) DEVICE — TR BAND RADIAL ARTERY COMPRESSION DEVICE: Brand: TR BAND

## (undated) DEVICE — YANKAUER,BULB TIP WITH VENT: Brand: ARGYLE

## (undated) DEVICE — THE CHANNEL CLEANING BRUSH IS A NYLON FLEXI BRUSH ATTACHED TO A FLEXIBLE PLASTIC SHEATH DESIGNED TO SAFELY REMOVE DEBRIS FROM FLEXIBLE ENDOSCOPES.

## (undated) DEVICE — SUP ARMBRD ART/LINE BLU

## (undated) DEVICE — CATH F5 INF PIG145 110CM 6SH: Brand: INFINITI